# Patient Record
Sex: FEMALE | Race: WHITE | Employment: UNEMPLOYED | URBAN - NONMETROPOLITAN AREA
[De-identification: names, ages, dates, MRNs, and addresses within clinical notes are randomized per-mention and may not be internally consistent; named-entity substitution may affect disease eponyms.]

---

## 2017-09-25 ENCOUNTER — OFFICE VISIT (OUTPATIENT)
Dept: OTOLARYNGOLOGY | Age: 42
End: 2017-09-25
Payer: MEDICAID

## 2017-09-25 VITALS
BODY MASS INDEX: 43.87 KG/M2 | HEART RATE: 66 BPM | HEIGHT: 64 IN | SYSTOLIC BLOOD PRESSURE: 120 MMHG | RESPIRATION RATE: 20 BRPM | WEIGHT: 257 LBS | DIASTOLIC BLOOD PRESSURE: 68 MMHG | TEMPERATURE: 96.2 F | OXYGEN SATURATION: 98 %

## 2017-09-25 DIAGNOSIS — K21.9 LPRD (LARYNGOPHARYNGEAL REFLUX DISEASE): ICD-10-CM

## 2017-09-25 DIAGNOSIS — E04.1 LEFT THYROID NODULE: Primary | ICD-10-CM

## 2017-09-25 DIAGNOSIS — E04.2 MULTIPLE THYROID NODULES: ICD-10-CM

## 2017-09-25 PROCEDURE — 99203 OFFICE O/P NEW LOW 30 MIN: CPT | Performed by: OTOLARYNGOLOGY

## 2017-09-25 RX ORDER — PAROXETINE HYDROCHLORIDE 20 MG/1
TABLET, FILM COATED ORAL
COMMUNITY
Start: 2017-08-10

## 2017-09-25 RX ORDER — ASPIRIN 81 MG/1
TABLET ORAL
COMMUNITY
Start: 2017-08-30

## 2017-09-25 RX ORDER — RANITIDINE 150 MG/1
TABLET ORAL
COMMUNITY
Start: 2017-09-19

## 2017-09-25 RX ORDER — HYDROCODONE BITARTRATE AND ACETAMINOPHEN 5; 325 MG/1; MG/1
TABLET ORAL
COMMUNITY
Start: 2017-08-30 | End: 2017-09-25

## 2017-09-25 RX ORDER — TRAZODONE HYDROCHLORIDE 50 MG/1
TABLET ORAL
COMMUNITY
Start: 2017-09-06 | End: 2017-09-25

## 2017-09-25 RX ORDER — DIAZEPAM 5 MG/1
TABLET ORAL
COMMUNITY
Start: 2017-09-06

## 2017-09-25 RX ORDER — TRAZODONE HYDROCHLORIDE 100 MG/1
TABLET ORAL
COMMUNITY
Start: 2017-09-12

## 2017-10-02 ENCOUNTER — HOSPITAL ENCOUNTER (OUTPATIENT)
Dept: ULTRASOUND IMAGING | Age: 42
Discharge: HOME | End: 2017-10-02
Payer: MEDICAID

## 2017-10-02 DIAGNOSIS — E04.1 LEFT THYROID NODULE: ICD-10-CM

## 2017-10-02 DIAGNOSIS — E04.2 MULTIPLE THYROID NODULES: ICD-10-CM

## 2017-10-02 PROCEDURE — 88334 PATH CONSLTJ SURG CYTO XM EA: CPT

## 2017-10-02 PROCEDURE — 88172 CYTP DX EVAL FNA 1ST EA SITE: CPT

## 2017-10-02 PROCEDURE — 88177 CYTP FNA EVAL EA ADDL: CPT

## 2017-10-02 PROCEDURE — 88173 CYTOPATH EVAL FNA REPORT: CPT

## 2017-10-02 PROCEDURE — 10022 US GUIDED THYROID BIOPSY PERCUTANEOUS: CPT

## 2017-10-02 PROCEDURE — 88333 PATH CONSLTJ SURG CYTO XM 1: CPT

## 2017-10-02 PROCEDURE — 88305 TISSUE EXAM BY PATHOLOGIST: CPT

## 2017-10-02 PROCEDURE — 76942 ECHO GUIDE FOR BIOPSY: CPT

## 2017-10-16 ENCOUNTER — OFFICE VISIT (OUTPATIENT)
Dept: OTOLARYNGOLOGY | Age: 42
End: 2017-10-16
Payer: MEDICAID

## 2017-10-16 VITALS
HEIGHT: 64 IN | OXYGEN SATURATION: 99 % | TEMPERATURE: 97.2 F | HEART RATE: 80 BPM | DIASTOLIC BLOOD PRESSURE: 70 MMHG | BODY MASS INDEX: 43.87 KG/M2 | RESPIRATION RATE: 20 BRPM | SYSTOLIC BLOOD PRESSURE: 126 MMHG | WEIGHT: 257 LBS

## 2017-10-16 DIAGNOSIS — Z71.2 ENCOUNTER TO DISCUSS TEST RESULTS: Primary | ICD-10-CM

## 2017-10-16 PROCEDURE — 99213 OFFICE O/P EST LOW 20 MIN: CPT | Performed by: OTOLARYNGOLOGY

## 2017-10-16 RX ORDER — OMEPRAZOLE 10 MG/1
10 CAPSULE, DELAYED RELEASE ORAL DAILY
COMMUNITY

## 2017-10-16 NOTE — PROGRESS NOTES
The results of the FNA cytology were explained and the options of observation, additional FNA biopsy, and surgery were likewise discussed. The object of thyroid nodule management is to identify the high risk patients and diagnose possible malignancies as early as possible. Although classification systems and risk assessment are taken into account for each patient the only certain method to determine if a malignancy is present is by removal of the entire lobe containing the nodule in question. The patient, after considering options has selected: Follicular Temple class II. Follow up future US thyroid in 6 - 12 months. Patient instructed to see PCP for US 1 year. Repeat FNA biopsy if any change in above  Will follow up with primary care and have repeat US in 1 year or less and return to our office if any significant change in symptoms or US      . Note:  A total of > 50%  (of the total minutes) of this 15 minute visit was spent discussing the pathophysiology and treatment options and/or coordination of care of the above dx.

## 2017-10-16 NOTE — PROGRESS NOTES
[facility]  [address] [place] [address]  Dept: [phone]  Dept Fax: [phone]  Loc: [phone]    Evette Aguilar is a 36 y.o. female who presents today for her medical conditions/complaints as noted below. Evette Aguilar is c/o of Follow-up (patient is here for Thyroid BX results )      History reviewed. No pertinent past medical history. History reviewed. No pertinent surgical history. History reviewed. No pertinent family history. Social History   Substance Use Topics    Smoking status: Never Smoker    Smokeless tobacco: Not on file    Alcohol use No      Current Outpatient Prescriptions   Medication Sig Dispense Refill    omeprazole (PRILOSEC) 10 MG delayed release capsule Take 10 mg by mouth daily      ASPIR-LOW 81 MG EC tablet       diazepam (VALIUM) 5 MG tablet       PARoxetine (PAXIL) 20 MG tablet       ranitidine (ZANTAC) 150 MG tablet       traZODone (DESYREL) 100 MG tablet        No current facility-administered medications for this visit. No Known Allergies    Subjective:      Review of Systems    Objective:     Physical Exam  /70   Pulse 80   Temp 97.2 °F (36.2 °C) (Temporal)   Resp 20   Ht 5' 4\" (1.626 m)   Wt 257 lb (116.6 kg)   SpO2 99%   BMI 44.11 kg/m²     Assessment:     No diagnosis found. Plan:     No orders of the defined types were placed in this encounter. Return if symptoms worsen or fail to improve. No orders of the defined types were placed in this encounter. No orders of the defined types were placed in this encounter. Patient given educational materials - see patient instructions. Discussed use, benefit, and side effects of prescribed medications. All patient questions answered. Pt voiced understanding. Reviewed health maintenance. Instructed to continue current medications, diet and exercise. Patient agreed with treatment plan. Follow up as directed.      There are no Patient Instructions on file for this visit.       Electronically signed by Merlin Seats, MD on 10/16/2017 at 9:12 AM

## 2020-01-07 ENCOUNTER — TELEPHONE (OUTPATIENT)
Dept: OTOLARYNGOLOGY | Age: 45
End: 2020-01-07

## 2020-01-07 NOTE — TELEPHONE ENCOUNTER
Called patient back. Dr Keyana Glaser would like to establish care.  Patient has an appointment in March